# Patient Record
Sex: FEMALE | Race: WHITE | Employment: FULL TIME | URBAN - METROPOLITAN AREA
[De-identification: names, ages, dates, MRNs, and addresses within clinical notes are randomized per-mention and may not be internally consistent; named-entity substitution may affect disease eponyms.]

---

## 2019-01-01 ENCOUNTER — TRANSFERRED RECORDS (OUTPATIENT)
Dept: HEALTH INFORMATION MANAGEMENT | Facility: CLINIC | Age: 49
End: 2019-01-01

## 2019-01-01 LAB — PAP SMEAR - HIM PATIENT REPORTED: NEGATIVE

## 2021-02-23 ENCOUNTER — OFFICE VISIT (OUTPATIENT)
Dept: OBGYN | Facility: CLINIC | Age: 51
End: 2021-02-23
Payer: COMMERCIAL

## 2021-02-23 VITALS — WEIGHT: 142 LBS | SYSTOLIC BLOOD PRESSURE: 114 MMHG | DIASTOLIC BLOOD PRESSURE: 62 MMHG

## 2021-02-23 DIAGNOSIS — N92.6 IRREGULAR PERIODS: ICD-10-CM

## 2021-02-23 DIAGNOSIS — N92.1 MENORRHAGIA WITH IRREGULAR CYCLE: Primary | ICD-10-CM

## 2021-02-23 LAB
ERYTHROCYTE [DISTWIDTH] IN BLOOD BY AUTOMATED COUNT: 13.9 % (ref 10–15)
HCG, QUAL URINE: NORMAL
HCT VFR BLD AUTO: 32.1 % (ref 35–47)
HGB BLD-MCNC: 10.2 G/DL (ref 11.7–15.7)
MCH RBC QN AUTO: 30.5 PG (ref 26.5–33)
MCHC RBC AUTO-ENTMCNC: 31.8 G/DL (ref 31.5–36.5)
MCV RBC AUTO: 96 FL (ref 78–100)
PLATELET # BLD AUTO: 312 10E9/L (ref 150–450)
RBC # BLD AUTO: 3.34 10E12/L (ref 3.8–5.2)
WBC # BLD AUTO: 7.5 10E9/L (ref 4–11)

## 2021-02-23 PROCEDURE — 84703 CHORIONIC GONADOTROPIN ASSAY: CPT | Performed by: ADVANCED PRACTICE MIDWIFE

## 2021-02-23 PROCEDURE — 99204 OFFICE O/P NEW MOD 45 MIN: CPT | Performed by: ADVANCED PRACTICE MIDWIFE

## 2021-02-23 PROCEDURE — 36415 COLL VENOUS BLD VENIPUNCTURE: CPT | Performed by: ADVANCED PRACTICE MIDWIFE

## 2021-02-23 PROCEDURE — 85027 COMPLETE CBC AUTOMATED: CPT | Performed by: ADVANCED PRACTICE MIDWIFE

## 2021-02-23 PROCEDURE — 84443 ASSAY THYROID STIM HORMONE: CPT | Performed by: ADVANCED PRACTICE MIDWIFE

## 2021-02-23 RX ORDER — MEDROXYPROGESTERONE ACETATE 10 MG
10 TABLET ORAL DAILY
Qty: 10 TABLET | Refills: 0 | Status: SHIPPED | OUTPATIENT
Start: 2021-02-23 | End: 2021-04-26

## 2021-02-23 NOTE — PATIENT INSTRUCTIONS
Patient Education     Heavy Menstrual Bleeding    Heavy menstrual bleeding means that your periods are heavier or longer than normal. You may soak through a pad or tampon every 1 to 3 hours on the heaviest days of your period. You may also pass large, dark clots. And your periods may last longer than 7 days.   If you have heavy periods often, this can cause a problem called anemia. With anemia, your red blood cell count is too low. Red blood cells are needed as they help carry oxygen all over your body. Severe anemia may make you look pale and feel weak or tired. You might also get short of breath easily.   There are many possible causes of heavy menstrual bleeding. Hormonal imbalance is the most common cause. Having noncancer (benign) growths in your uterus is another cause. These include fibroids or polyps. Taking certain medicines or having certain health problems or bleeding disorders are also causes.   To treat heavy menstrual bleeding, your healthcare provider may prescribe medicines first. If these don t help, you may need more testing and treatments.   Home care  Medicines  If you re prescribed medicines, take them as directed.     To help control heavy bleeding, any of these may be used:  ? Hormone therapy (this includes all types of hormonal birth control such as pills, shots, cream, ring, patch, or hormone-releasing IUD)  ? Nonsteroidal anti-inflammatory drugs (NSAIDs), such as ibuprofen  ? Antifibrinolytic medicines, such as transexamic acid    To help treat anemia, iron supplements may be prescribed.            General care    Get plenty of rest if you tire easily. Avoid heavy exertion.    To help ease pain or cramping, try using a heating pad on the lower belly or back. A warm bath may also help.    Follow-up care  Follow up with your healthcare provider, or as advised.   When to get medical advice  Call your healthcare provider right away if any of these occur:     Heavier bleeding (soaking 1 pad or  tampon every hour for 3 hours)    Heavy bleeding that lasts longer than 1 week    Fever of 100.4 F (38 C) or higher, or as directed by your provider    Pain or cramping that gets worse instead of better    Signs of anemia such as pale skin, extreme tiredness or weakness, or shortness of breath    Dizziness or fainting  Galen last reviewed this educational content on 6/1/2020 2000-2020 [employer], [email or web address]. [address] [place] [address], [place] [address]. All rights reserved. This information is not intended as a substitute for professional medical care. Always follow your healthcare professional's instructions.           Patient Education     Dysfunctional Uterine Bleeding    Dysfunctional uterine bleeding is also called abnormal uterine bleeding. It's a condition in which bleeding is abnormal and occurs at unexpected times of the month. This happens because of changes in the hormones that help control a woman s menstrual cycle each month.   The bleeding may be heavier or lighter than normal. If you have heavy bleeding often, this can lead to a problem called anemia. With anemia, your red blood cell count is too low. Red blood cells help carry oxygen throughout your body. Severe anemia may cause you to look pale and feel very weak or tired. You might also become short of breath easily.   To treat dysfunctional uterine bleeding, you may take medicines. If these don t help, or if you have other symptoms or have reached menopause, you may need more testing and other treatments. Discuss all of your options with your provider.   Home care  Medicines  If you re prescribed medicines, take them as directed. Some of the more common medicines you may be prescribed include:     Hormone therapy. These include most methods of hormonal birth control such as pills, shots, or a hormone-releasing IUD.    Nonsteroidal anti-inflammatory drugs (NSAIDs), such as ibuprofen    Tranexamic acid. This helps your blood clot.    Iron  supplements, if you have anemia     General care    Get plenty of rest if you tire easily. Don't do strenuous exercise.    To help ease pain or cramping that may occur with bleeding, try using a heating pad on the lower belly or back. A warm bath may also help.    Follow-up care  Follow up with your healthcare provider, or as directed.  When to seek medical advice  Call your healthcare provider right away if:    Bleeding becomes heavy (soaking 1 pad or tampon every hour for 3 hours)    Increased abdominal pain    Irregular bleeding gets worse or does not get better even with treatment    Fever of 100.4 F (38 C) or higher, or as directed by your provider    Signs of anemia, such as pale skin, extreme fatigue or weakness, or shortness of breath    Dizziness or fainting   Galen last reviewed this educational content on 7/1/2020 2000-2020 [employer]. [address] [place] [address], [place] [address]. All rights reserved. This information is not intended as a substitute for professional medical care. Always follow your healthcare professional's instructions.

## 2021-02-23 NOTE — NURSING NOTE
"Chief Complaint   Patient presents with     Abnormal Uterine Bleeding     heavy period for over 3 wks       Initial /62 (BP Location: Left arm, Cuff Size: Adult Regular)   Wt 64.4 kg (142 lb)   LMP 2021 (Approximate)  Estimated body mass index is 21.31 kg/m  as calculated from the following:    Height as of 05: 1.676 m (5' 6\").    Weight as of 05: 59.9 kg (132 lb).  BP completed using cuff size: regular    Questioned patient about current smoking habits.  Pt. has never smoked.          The following HM Due: NONE      The following patient reported/Care Every where data was sent to:  P ABSTRACT QUALITY INITIATIVES [[id number]]  Pap smear done on this date: 2019 (approximately), results were normal.       Nila Fields CMA               "

## 2021-02-23 NOTE — PROGRESS NOTES
Midwife Dysfunctional Uterine Bleeding Note    Date: 2021    CC:  Abnormal Uterine Bleeding    HPI:  Leslee Bazzi is a 50 year old female  presents for evaluation of abnormal uterine bleeding.      Duration of bleeding problem: Menses started getting irregular about a year ago, started bleeding on 2021, and is still bleeding now.  Frequency of bleeding: Daily for past 21 days.  Flow: heavy, changes a pad every 1 hours at the heaviest  Breakthrough bleeding: no  Post-coital bleeding: no  Pelvic pain: no    Her work-up thus far for her abnormal bleeding has included:  - pregnancy test: neg   - TSH: pending  - transvaginal ultrasound: pending  - Hgb: pending   - STI screen: declined   - Last pap: 2019, WNL    Patient has tried to following treatments: none.     GYN HISTORY:  Patient's last menstrual period was 2021 (approximate).     Menopause: no  Menses: occured every 28-52 days lasting 7-10  days in duration.   STI history: No STD history    History of cervical procedures: none   Contraceptive History:  had vasectomy 12 years ago  The patient is sexually active with male partners.       Patient has following risk factors for endometrial cancer:  - Unopposed estrogen exposure: No  - Obesity: No  - Smoking: No  - Nulliparity: No  - Infertility: No  - Early age of menarche / late age of menopause: No  - Family history of colon cancer, ovarian cancer, and type I endometrial cancer: No    OBSTETRIC HISTORY:   OB History    Para Term  AB Living   2 2 0 0 0 0   SAB TAB Ectopic Multiple Live Births   0 0 0 0 0      # Outcome Date GA Lbr Chip/2nd Weight Sex Delivery Anes PTL Lv   2 Para      Vag-Spont      1 Para      Vag-Spont            Past Medical History:   Diagnosis Date     ACNE       DEPRESSION POSTPARTUM 2005     GENERALIZED ABDOMINAL PAIN, ? IBS 2005     HX SUBLUXATION OF R PATELLA      Radial styloid tenosynovitis 2005         Past Surgical History:    Procedure Laterality Date     HC DESTRUCT PREMALIGNANT LESION, FIRST  4/02    shave bx, compound nevus L ant chest wall           Family History   Problem Relation Age of Onset     Hypertension Father      Prostate Cancer Father      Hypertension Mother      Cancer Maternal Grandfather         LUNG     Cancer Paternal Grandfather         LUNG     Diabetes Other         GM     Cerebrovascular Disease Other         GM     There is no family history of uterine, ovarian, breast, colon cancer.     Current Outpatient Medications   Medication Sig Dispense Refill     medroxyPROGESTERone (PROVERA) 10 MG tablet Take 1 tablet (10 mg) by mouth daily 10 tablet 0       Allergies: No known drug allergies    ROS:  C: NEGATIVE for fever, chills, change in weight  I: NEGATIVE for worrisome rashes, moles or lesions  E: NEGATIVE for vision changes or irritation  E/M: NEGATIVE for ear, mouth and throat problems  R: NEGATIVE for significant cough or SOB  CV: NEGATIVE for chest pain, palpitations or peripheral edema  GI: NEGATIVE for nausea, abdominal pain, heartburn, or change in bowel habits  : POSITIVE for abnormal bleeding as above, NEGATIVE for frequency, dysuria, hematuria, vaginal discharge  M: NEGATIVE for significant arthralgias or myalgia  N: NEGATIVE for weakness, dizziness or paresthesias  E: NEGATIVE for temperature intolerance, skin/hair changes  P: NEGATIVE for changes in mood or affect    EXAM:  Blood pressure 114/62, weight 64.4 kg (142 lb), last menstrual period 02/01/2021.   BMI= There is no height or weight on file to calculate BMI.  General - pleasant female in no acute distress.  Abdomen - soft, nontender  Pelvic - external genitalia: normal adult female without lesions or abnormalities   BUS: within normal limits  Vagina: well rugated, no discharge, no lesions  Cervix: no lesions or CMT, bleeding noted to be coming from cervical os  Uterus: Normal size, mobile and nontender  Adnexae: no masses or  tenderness.  Rectovaginal - deferred.  Musculoskeletal - no gross deformities.  Neurological - normal strength, sensation, and mental status.      ASSESSMENT:  Leslee Bazzi is a 50 year old  who presents with abnormal uterine bleeding. Discussed differential diagnosis.     PLAN:    ICD-10-CM    1. Menorrhagia with irregular cycle  N92.1 TSH with free T4 reflex     CBC with platelets     US Pelvic Complete with Transvaginal     medroxyPROGESTERone (PROVERA) 10 MG tablet   2. Irregular periods  N92.6 HCL HCG, URINE, NURSE BACKOFFICE      Endometrial biopsy was not today to rule out hyperplasia and malignancy. Patient prefers to wait until after her ultrasound  Transvaginal ultrasound: yes, after withdrawal bleed from provera.  Cervical cancer screening: yes, in 2019    F/U:  Will advise patient of results when available. If all normal. Patient is interested in ablation. Will refer to surgeon for follow up when labs and ultrasound available.      WALDEMAR Maldonado, ZEYADM

## 2021-02-24 ENCOUNTER — TELEPHONE (OUTPATIENT)
Dept: OBGYN | Facility: CLINIC | Age: 51
End: 2021-02-24

## 2021-02-24 DIAGNOSIS — D62 ANEMIA DUE TO BLOOD LOSS, ACUTE: Primary | ICD-10-CM

## 2021-02-24 LAB — TSH SERPL DL<=0.005 MIU/L-ACNC: 0.92 MU/L (ref 0.4–4)

## 2021-02-24 RX ORDER — FERROUS SULFATE 325(65) MG
325 TABLET, DELAYED RELEASE (ENTERIC COATED) ORAL EVERY OTHER DAY
Qty: 30 TABLET | Refills: 1 | Status: SHIPPED | OUTPATIENT
Start: 2021-02-24

## 2021-02-24 NOTE — TELEPHONE ENCOUNTER
Attempted to call patient regarding recent lab results. Patient has become anemic due to heavy prolonged menses. I have ordered iron supplements for her and sent them to her pharmacy, the [facility] in [place]. I have also ordered her a follow up CBC that I want her to schedule about 4 weeks after she has been taking the iron supplements.      Thank you,    WALDEMAR Maldonado, ZEYADM

## 2021-03-01 ENCOUNTER — MYC MEDICAL ADVICE (OUTPATIENT)
Dept: OBGYN | Facility: CLINIC | Age: 51
End: 2021-03-01

## 2021-03-01 DIAGNOSIS — N92.1 MENORRHAGIA WITH IRREGULAR CYCLE: Primary | ICD-10-CM

## 2021-03-01 RX ORDER — MEDROXYPROGESTERONE ACETATE 10 MG
10 TABLET ORAL DAILY
Qty: 10 TABLET | Refills: 0 | Status: SHIPPED | OUTPATIENT
Start: 2021-03-01 | End: 2021-04-26

## 2021-03-11 ENCOUNTER — MYC MEDICAL ADVICE (OUTPATIENT)
Dept: OBGYN | Facility: CLINIC | Age: 51
End: 2021-03-11

## 2021-03-18 ENCOUNTER — ANCILLARY PROCEDURE (OUTPATIENT)
Dept: ULTRASOUND IMAGING | Facility: CLINIC | Age: 51
End: 2021-03-18
Attending: ADVANCED PRACTICE MIDWIFE
Payer: COMMERCIAL

## 2021-03-18 DIAGNOSIS — N92.1 MENORRHAGIA WITH IRREGULAR CYCLE: ICD-10-CM

## 2021-03-18 PROCEDURE — 76856 US EXAM PELVIC COMPLETE: CPT | Performed by: OBSTETRICS & GYNECOLOGY

## 2021-03-18 PROCEDURE — 76830 TRANSVAGINAL US NON-OB: CPT | Performed by: OBSTETRICS & GYNECOLOGY

## 2021-03-22 ENCOUNTER — MYC MEDICAL ADVICE (OUTPATIENT)
Dept: OBGYN | Facility: CLINIC | Age: 51
End: 2021-03-22

## 2021-03-22 NOTE — TELEPHONE ENCOUNTER
Called patient and discussed result of ultrasound. Patient already has appointment to see Dr. Wilcox to discuss possible ablation.      WALDEMAR Maldonado, CNM

## 2021-04-04 ENCOUNTER — HEALTH MAINTENANCE LETTER (OUTPATIENT)
Age: 51
End: 2021-04-04

## 2021-04-26 ENCOUNTER — OFFICE VISIT (OUTPATIENT)
Dept: OBGYN | Facility: CLINIC | Age: 51
End: 2021-04-26
Payer: COMMERCIAL

## 2021-04-26 ENCOUNTER — TELEPHONE (OUTPATIENT)
Dept: OBGYN | Facility: CLINIC | Age: 51
End: 2021-04-26

## 2021-04-26 VITALS
WEIGHT: 135 LBS | DIASTOLIC BLOOD PRESSURE: 70 MMHG | HEIGHT: 66 IN | SYSTOLIC BLOOD PRESSURE: 120 MMHG | BODY MASS INDEX: 21.69 KG/M2

## 2021-04-26 DIAGNOSIS — N93.9 ABNORMAL UTERINE BLEEDING (AUB): Primary | ICD-10-CM

## 2021-04-26 DIAGNOSIS — Z20.822 COVID-19 RULED OUT: ICD-10-CM

## 2021-04-26 DIAGNOSIS — Z01.818 PREPROCEDURAL EXAMINATION: Primary | ICD-10-CM

## 2021-04-26 DIAGNOSIS — D62 ANEMIA DUE TO BLOOD LOSS, ACUTE: ICD-10-CM

## 2021-04-26 PROCEDURE — 99214 OFFICE O/P EST MOD 30 MIN: CPT | Performed by: OBSTETRICS & GYNECOLOGY

## 2021-04-26 RX ORDER — FAMOTIDINE 20 MG
TABLET ORAL DAILY
COMMUNITY
End: 2021-05-20

## 2021-04-26 ASSESSMENT — MIFFLIN-ST. JEOR: SCORE: 1249.11

## 2021-04-26 NOTE — TELEPHONE ENCOUNTER
Type of surgery: hysteroscopy, d&c, endometrial ablation  Location of surgery: [facility]  Date and time of surgery: 5/12/21 @ 8:00 am  Surgeon: Dr. Victorino Wilcox  Pre-Op Appt Date: Patient advised to schedule.  Post-Op Appt Date:    Packet sent out: Yes  Pre-cert/Authorization completed:  No  Date: 4/26/21

## 2021-04-26 NOTE — PROGRESS NOTES
"  SUBJECTIVE:                                                   CC:  Patient presents with:  Abnormal Bleeding Problem      HPI:  Leslee Bazzi is a 50 year old  who presents to discuss endometrial ablation.  She was worked up for AUB with Tami Finn CNM and desired ablation.  She has several friends who have had it and really liked it.  Cycles are irregular, very heavy lasting up to 10 days.  Has been on iron due to iron deficiency from the heavy bleeding.   She just had a period start yesterday and it's pretty heavy.   Has two kids, ages 12 and 16.   Vasectomy for birth control     Gyn History:  Patient's last menstrual period was 2021 (exact date).       Using vasectomy for contraception.    Last 3 Pap and HPV Results:   PAP / HPV 2005   PAP NIL   normal in 2019 per pt report    PMH, PSH, Soc Hx, Fam Hx, Meds, and allergies reviewed in Epic.    OBJECTIVE:     /70 (BP Location: Right arm, Patient Position: Chair, Cuff Size: Adult Regular)   Ht 1.676 m (5' 6\")   Wt 61.2 kg (135 lb)   LMP 2021 (Exact Date)   Breastfeeding No   BMI 21.79 kg/m      Gen: Healthy appearing thin female, no acute distress, comfortable  Psych: mentation appears normal and affect bright    Test Results:  [facility]     ULTRASOUND - PELVIC GYN- Transabdominal and Transvaginal     Referring MD: Tami Finn CNM     ===================================     CLINICAL INFORMATION     Indications for ultrasound:   Bleeding/Menses - Menometrorrhagia (heavy, irregular menses)     LMP: 2021    Hormones: none     Measurements:  Uterus:  11.1 x 8.2 x 6.8  cm     Position is anteverted.  Contour is irregular w/ myomata:   1 Right Fundal 3.3 x 3.9 x 4.2 cm and 2 Left Fundal 2.5 x 1.8 x 1.8 cm.     Endo cav: 9.1 mm       Smooth/regular/wnl     Right ovary: 2.8  X 3.1 x 3.4 cm  Simple cyst 2.1 x 1.9 x 2.3cm  Left ovary:  2.1 x 1.9 x 1.6 cm  wnl     Cul de sac: no " free fluid     ===================================  Impression:     Complete pelvic ultrasound using realtime   transabdominal and transvaginal scanning.  Bladder appears normal.     Normal left ovary  Uterine fibroids, not affecting the uterine cavity  Simple right ovarian cyst(s)  Normal endometrial lining  No free fluid in the cul de sac     Asymptomatic simple cysts less than 5 cm in premenopausal women do not require follow up as a general rule, but clinical correlation is recommended.       Component      Latest Ref Rng & Units 2/23/2021   WBC      4.0 - 11.0 10e9/L 7.5   RBC Count      3.8 - 5.2 10e12/L 3.34 (L)   Hemoglobin      11.7 - 15.7 g/dL 10.2 (L)   Hematocrit      35.0 - 47.0 % 32.1 (L)   MCV      78 - 100 fl 96   MCH      26.5 - 33.0 pg 30.5   MCHC      31.5 - 36.5 g/dL 31.8   RDW      10.0 - 15.0 % 13.9   Platelet Count      150 - 450 10e9/L 312   TSH      0.40 - 4.00 mU/L 0.92     ASSESSMENT/PLAN:                                                      1. Abnormal uterine bleeding (AUB)  Discussed options for treatment including OCPs, LARCs, and surgical options including ablation and hysterectomy.  She has had a work up as above, desirous of endometrial ablation.  Vasectomy for birth control.  Unsure what age her mother went through menopause.  Declines EMB today as she is bleeding already.  Recommend hysteroscopy with D&C, endometrial ablation.  Discussed risks of surgery including bleeding to the point of requiring blood transfusion, infection, injury to surrounding organs, possibility of needing a larger incision, blood clot, and pneumonia.  Discussed risks of endometrial ablation of undiagnosed endometrial hyperplasia, cramping post op, or recovery of the lining and recurrent AUB.  Discussed recovery expectations and that this is typically a same-day surgery.  Pt has had all questions answered and has agreed to proceed.  Will meet with PCP for pre-op physical with PCP.  Will sign consent form  day of the procedure.  Pre-op orders entered.   - [facility]-Operative Worksheet    2. Anemia due to blood loss, acute  Continue iron until procedure, she may not require it after that time.    Fanny Wilcox MD, MPH  Obstetrics and Gynecology      Total time spent was 32 minutes; this includes pre-work time, intra-service time, and post-work time including time spent on documentation which occurred on the date of service.

## 2021-04-26 NOTE — NURSING NOTE
"Chief Complaint   Patient presents with     Abnormal Bleeding Problem       Initial /70 (BP Location: Right arm, Patient Position: Chair, Cuff Size: Adult Regular)   Ht 1.676 m (5' 6\")   Wt 61.2 kg (135 lb)   LMP 2021 (Exact Date)   Breastfeeding No   BMI 21.79 kg/m   Estimated body mass index is 21.79 kg/m  as calculated from the following:    Height as of this encounter: 1.676 m (5' 6\").    Weight as of this encounter: 61.2 kg (135 lb).  BP completed using cuff size: regular    Questioned patient about current smoking habits.  Pt. has never smoked.          The following HM Due: NONE    Quyen Armas CMA    "

## 2021-04-26 NOTE — TELEPHONE ENCOUNTER
Procedure name(s) - multi select hysteroscopy, D&C, endometrial ablation   Reason for procedure abnormal uterine bleeding   Is this a multi surgeon case? No   Laterality N/A   Request for additional equipment Other (see comments)   Comment: None   Anesthesia Choice   Initiate Pre-op orders for above procedure: Yes, as ordered in Epic   Comment: Additional orders noted there also   Location of Case: [facility]   Surgeon Procedure Time (incision to closure) in minutes (per procedure as applicable) 30   Note:  Surgical Case Time Needed (in minutes)   Patient Class (for admit prior to surgery, specify number of days in comments): Same day (surgery center outpatient)   Vendor Needed? Yes   Comment: [employer]   Other/Additional Comments, as needed: could be at hospital or ASC

## 2021-05-09 ENCOUNTER — HOSPITAL ENCOUNTER (OUTPATIENT)
Dept: LAB | Facility: CLINIC | Age: 51
Discharge: HOME | End: 2021-05-09
Attending: OBSTETRICS & GYNECOLOGY | Admitting: OBSTETRICS & GYNECOLOGY
Payer: COMMERCIAL

## 2021-05-09 DIAGNOSIS — Z01.818 PREPROCEDURAL EXAMINATION: ICD-10-CM

## 2021-05-09 DIAGNOSIS — Z20.822 COVID-19 RULED OUT: ICD-10-CM

## 2021-05-09 LAB
LABORATORY COMMENT REPORT: NORMAL
SARS-COV-2 RNA RESP QL NAA+PROBE: NEGATIVE
SARS-COV-2 RNA RESP QL NAA+PROBE: NORMAL
SPECIMEN SOURCE: NORMAL
SPECIMEN SOURCE: NORMAL

## 2021-05-09 PROCEDURE — U0003 INFECTIOUS AGENT DETECTION BY NUCLEIC ACID (DNA OR RNA); SEVERE ACUTE RESPIRATORY SYNDROME CORONAVIRUS 2 (SARS-COV-2) (CORONAVIRUS DISEASE [COVID-19]), AMPLIFIED PROBE TECHNIQUE, MAKING USE OF HIGH THROUGHPUT TECHNOLOGIES AS DESCRIBED BY CMS-2020-01-R: HCPCS | Performed by: OBSTETRICS & GYNECOLOGY

## 2021-05-09 PROCEDURE — U0005 INFEC AGEN DETEC AMPLI PROBE: HCPCS | Performed by: OBSTETRICS & GYNECOLOGY

## 2021-05-11 ENCOUNTER — PREP FOR PROCEDURE (OUTPATIENT)
Dept: OBGYN | Facility: CLINIC | Age: 51
End: 2021-05-11

## 2021-05-11 DIAGNOSIS — Z11.59 ENCOUNTER FOR SCREENING FOR OTHER VIRAL DISEASES: ICD-10-CM

## 2021-05-11 DIAGNOSIS — N93.9 ABNORMAL UTERINE BLEEDING: Primary | ICD-10-CM

## 2021-05-11 NOTE — TELEPHONE ENCOUNTER
Received a call from the surgery center.  Patient is not a candidate to be scheduled at the surgery center and will need to be rescheduled at the hospital.    Type of surgery: hysteroscopy, d&c, endometrial ablation  Location of surgery: [facility]  Date and time of surgery: 5/26/21 @ 8:15 am  Surgeon: Dr. Victorino Wilcox  Pre-Op Appt Date: 5/3/21  Post-Op Appt Date:    Packet sent out: Yes  Pre-cert/Authorization completed:  No  Date: 5/11/21

## 2021-05-20 ASSESSMENT — MIFFLIN-ST. JEOR: SCORE: 1219.63

## 2021-05-23 ENCOUNTER — HOSPITAL ENCOUNTER (OUTPATIENT)
Dept: LAB | Facility: CLINIC | Age: 51
Discharge: HOME | End: 2021-05-23
Admitting: OBSTETRICS & GYNECOLOGY
Payer: COMMERCIAL

## 2021-05-23 DIAGNOSIS — Z11.59 ENCOUNTER FOR SCREENING FOR OTHER VIRAL DISEASES: ICD-10-CM

## 2021-05-23 PROCEDURE — U0005 INFEC AGEN DETEC AMPLI PROBE: HCPCS | Performed by: OBSTETRICS & GYNECOLOGY

## 2021-05-23 PROCEDURE — U0003 INFECTIOUS AGENT DETECTION BY NUCLEIC ACID (DNA OR RNA); SEVERE ACUTE RESPIRATORY SYNDROME CORONAVIRUS 2 (SARS-COV-2) (CORONAVIRUS DISEASE [COVID-19]), AMPLIFIED PROBE TECHNIQUE, MAKING USE OF HIGH THROUGHPUT TECHNOLOGIES AS DESCRIBED BY CMS-2020-01-R: HCPCS | Performed by: OBSTETRICS & GYNECOLOGY

## 2021-05-26 ENCOUNTER — ANESTHESIA (OUTPATIENT)
Dept: SURGERY | Facility: CLINIC | Age: 51
End: 2021-05-26
Payer: COMMERCIAL

## 2021-05-26 ENCOUNTER — ANESTHESIA EVENT (OUTPATIENT)
Dept: SURGERY | Facility: CLINIC | Age: 51
End: 2021-05-26
Payer: COMMERCIAL

## 2021-05-26 ENCOUNTER — HOSPITAL ENCOUNTER (OUTPATIENT)
Facility: CLINIC | Age: 51
Discharge: HOME | End: 2021-05-26
Attending: OBSTETRICS & GYNECOLOGY | Admitting: OBSTETRICS & GYNECOLOGY
Payer: COMMERCIAL

## 2021-05-26 VITALS
BODY MASS INDEX: 22.36 KG/M2 | TEMPERATURE: 97.2 F | RESPIRATION RATE: 16 BRPM | WEIGHT: 134.2 LBS | OXYGEN SATURATION: 99 % | DIASTOLIC BLOOD PRESSURE: 72 MMHG | SYSTOLIC BLOOD PRESSURE: 115 MMHG | HEIGHT: 65 IN | HEART RATE: 70 BPM

## 2021-05-26 DIAGNOSIS — N93.9 ABNORMAL UTERINE BLEEDING: ICD-10-CM

## 2021-05-26 DIAGNOSIS — Z98.890 S/P ENDOMETRIAL ABLATION: Primary | ICD-10-CM

## 2021-05-26 LAB — HCG UR QL: NEGATIVE

## 2021-05-26 PROCEDURE — 710N000009 HC RECOVERY PHASE 1, LEVEL 1, PER MIN: Performed by: OBSTETRICS & GYNECOLOGY

## 2021-05-26 PROCEDURE — 250N000011 HC RX IP 250 OP 636: Performed by: NURSE ANESTHETIST, CERTIFIED REGISTERED

## 2021-05-26 PROCEDURE — 272N000001 HC OR GENERAL SUPPLY STERILE: Performed by: OBSTETRICS & GYNECOLOGY

## 2021-05-26 PROCEDURE — 250N000011 HC RX IP 250 OP 636: Performed by: OBSTETRICS & GYNECOLOGY

## 2021-05-26 PROCEDURE — 258N000003 HC RX IP 258 OP 636: Performed by: NURSE ANESTHETIST, CERTIFIED REGISTERED

## 2021-05-26 PROCEDURE — 58563 HYSTEROSCOPY ABLATION: CPT | Performed by: OBSTETRICS & GYNECOLOGY

## 2021-05-26 PROCEDURE — 360N000076 HC SURGERY LEVEL 3, PER MIN: Performed by: OBSTETRICS & GYNECOLOGY

## 2021-05-26 PROCEDURE — C1888 ENDOVAS NON-CARDIAC ABL CATH: HCPCS | Performed by: OBSTETRICS & GYNECOLOGY

## 2021-05-26 PROCEDURE — 999N000141 HC STATISTIC PRE-PROCEDURE NURSING ASSESSMENT: Performed by: OBSTETRICS & GYNECOLOGY

## 2021-05-26 PROCEDURE — 88305 TISSUE EXAM BY PATHOLOGIST: CPT | Mod: 26 | Performed by: PATHOLOGY

## 2021-05-26 PROCEDURE — 370N000017 HC ANESTHESIA TECHNICAL FEE, PER MIN: Performed by: OBSTETRICS & GYNECOLOGY

## 2021-05-26 PROCEDURE — 250N000009 HC RX 250: Performed by: OBSTETRICS & GYNECOLOGY

## 2021-05-26 PROCEDURE — 710N000012 HC RECOVERY PHASE 2, PER MINUTE: Performed by: OBSTETRICS & GYNECOLOGY

## 2021-05-26 PROCEDURE — 81025 URINE PREGNANCY TEST: CPT | Performed by: ANESTHESIOLOGY

## 2021-05-26 PROCEDURE — 88305 TISSUE EXAM BY PATHOLOGIST: CPT | Mod: TC | Performed by: OBSTETRICS & GYNECOLOGY

## 2021-05-26 PROCEDURE — 250N000013 HC RX MED GY IP 250 OP 250 PS 637: Performed by: OBSTETRICS & GYNECOLOGY

## 2021-05-26 PROCEDURE — 250N000009 HC RX 250: Performed by: NURSE ANESTHETIST, CERTIFIED REGISTERED

## 2021-05-26 PROCEDURE — 258N000001 HC RX 258: Performed by: OBSTETRICS & GYNECOLOGY

## 2021-05-26 RX ORDER — NALOXONE HYDROCHLORIDE 0.4 MG/ML
0.4 INJECTION, SOLUTION INTRAMUSCULAR; INTRAVENOUS; SUBCUTANEOUS
Status: DISCONTINUED | OUTPATIENT
Start: 2021-05-26 | End: 2021-05-26 | Stop reason: HOSPADM

## 2021-05-26 RX ORDER — ONDANSETRON 2 MG/ML
4 INJECTION INTRAMUSCULAR; INTRAVENOUS EVERY 30 MIN PRN
Status: DISCONTINUED | OUTPATIENT
Start: 2021-05-26 | End: 2021-05-26 | Stop reason: HOSPADM

## 2021-05-26 RX ORDER — LIDOCAINE HYDROCHLORIDE 10 MG/ML
INJECTION, SOLUTION INFILTRATION; PERINEURAL PRN
Status: DISCONTINUED | OUTPATIENT
Start: 2021-05-26 | End: 2021-05-26

## 2021-05-26 RX ORDER — DEXAMETHASONE SODIUM PHOSPHATE 4 MG/ML
INJECTION, SOLUTION INTRA-ARTICULAR; INTRALESIONAL; INTRAMUSCULAR; INTRAVENOUS; SOFT TISSUE PRN
Status: DISCONTINUED | OUTPATIENT
Start: 2021-05-26 | End: 2021-05-26

## 2021-05-26 RX ORDER — FENTANYL CITRATE 50 UG/ML
25-50 INJECTION, SOLUTION INTRAMUSCULAR; INTRAVENOUS
Status: DISCONTINUED | OUTPATIENT
Start: 2021-05-26 | End: 2021-05-26 | Stop reason: HOSPADM

## 2021-05-26 RX ORDER — ONDANSETRON 4 MG/1
4 TABLET, ORALLY DISINTEGRATING ORAL EVERY 30 MIN PRN
Status: DISCONTINUED | OUTPATIENT
Start: 2021-05-26 | End: 2021-05-26 | Stop reason: HOSPADM

## 2021-05-26 RX ORDER — IBUPROFEN 800 MG/1
800 TABLET, FILM COATED ORAL EVERY 6 HOURS PRN
Qty: 30 TABLET | Refills: 0 | Status: SHIPPED | OUTPATIENT
Start: 2021-05-26

## 2021-05-26 RX ORDER — SODIUM CHLORIDE, SODIUM LACTATE, POTASSIUM CHLORIDE, CALCIUM CHLORIDE 600; 310; 30; 20 MG/100ML; MG/100ML; MG/100ML; MG/100ML
INJECTION, SOLUTION INTRAVENOUS CONTINUOUS PRN
Status: DISCONTINUED | OUTPATIENT
Start: 2021-05-26 | End: 2021-05-26

## 2021-05-26 RX ORDER — PROPOFOL 10 MG/ML
INJECTION, EMULSION INTRAVENOUS PRN
Status: DISCONTINUED | OUTPATIENT
Start: 2021-05-26 | End: 2021-05-26

## 2021-05-26 RX ORDER — ACETAMINOPHEN 325 MG/1
975 TABLET ORAL ONCE
Status: COMPLETED | OUTPATIENT
Start: 2021-05-26 | End: 2021-05-26

## 2021-05-26 RX ORDER — KETOROLAC TROMETHAMINE 30 MG/ML
30 INJECTION, SOLUTION INTRAMUSCULAR; INTRAVENOUS ONCE
Status: COMPLETED | OUTPATIENT
Start: 2021-05-26 | End: 2021-05-26

## 2021-05-26 RX ORDER — GLYCOPYRROLATE 0.2 MG/ML
INJECTION, SOLUTION INTRAMUSCULAR; INTRAVENOUS PRN
Status: DISCONTINUED | OUTPATIENT
Start: 2021-05-26 | End: 2021-05-26

## 2021-05-26 RX ORDER — ACETAMINOPHEN 325 MG/1
975 TABLET ORAL ONCE
Status: CANCELLED | OUTPATIENT
Start: 2021-05-26 | End: 2021-05-26

## 2021-05-26 RX ORDER — ONDANSETRON 2 MG/ML
INJECTION INTRAMUSCULAR; INTRAVENOUS PRN
Status: DISCONTINUED | OUTPATIENT
Start: 2021-05-26 | End: 2021-05-26

## 2021-05-26 RX ORDER — HYDROMORPHONE HYDROCHLORIDE 1 MG/ML
.3-.5 INJECTION, SOLUTION INTRAMUSCULAR; INTRAVENOUS; SUBCUTANEOUS EVERY 10 MIN PRN
Status: DISCONTINUED | OUTPATIENT
Start: 2021-05-26 | End: 2021-05-26 | Stop reason: HOSPADM

## 2021-05-26 RX ORDER — IBUPROFEN 800 MG/1
800 TABLET, FILM COATED ORAL ONCE
Status: CANCELLED | OUTPATIENT
Start: 2021-05-26 | End: 2021-05-26

## 2021-05-26 RX ORDER — MEPERIDINE HYDROCHLORIDE 25 MG/ML
12.5 INJECTION INTRAMUSCULAR; INTRAVENOUS; SUBCUTANEOUS
Status: DISCONTINUED | OUTPATIENT
Start: 2021-05-26 | End: 2021-05-26 | Stop reason: HOSPADM

## 2021-05-26 RX ORDER — NALOXONE HYDROCHLORIDE 0.4 MG/ML
0.2 INJECTION, SOLUTION INTRAMUSCULAR; INTRAVENOUS; SUBCUTANEOUS
Status: DISCONTINUED | OUTPATIENT
Start: 2021-05-26 | End: 2021-05-26 | Stop reason: HOSPADM

## 2021-05-26 RX ORDER — SODIUM CHLORIDE, SODIUM LACTATE, POTASSIUM CHLORIDE, CALCIUM CHLORIDE 600; 310; 30; 20 MG/100ML; MG/100ML; MG/100ML; MG/100ML
INJECTION, SOLUTION INTRAVENOUS CONTINUOUS
Status: DISCONTINUED | OUTPATIENT
Start: 2021-05-26 | End: 2021-05-26 | Stop reason: HOSPADM

## 2021-05-26 RX ORDER — FENTANYL CITRATE 50 UG/ML
INJECTION, SOLUTION INTRAMUSCULAR; INTRAVENOUS PRN
Status: DISCONTINUED | OUTPATIENT
Start: 2021-05-26 | End: 2021-05-26

## 2021-05-26 RX ORDER — LABETALOL HYDROCHLORIDE 5 MG/ML
10 INJECTION, SOLUTION INTRAVENOUS
Status: DISCONTINUED | OUTPATIENT
Start: 2021-05-26 | End: 2021-05-26 | Stop reason: HOSPADM

## 2021-05-26 RX ADMIN — KETOROLAC TROMETHAMINE 30 MG: 30 INJECTION, SOLUTION INTRAMUSCULAR; INTRAVENOUS at 07:28

## 2021-05-26 RX ADMIN — FENTANYL CITRATE 100 MCG: 50 INJECTION, SOLUTION INTRAMUSCULAR; INTRAVENOUS at 08:23

## 2021-05-26 RX ADMIN — PROPOFOL 200 MG: 10 INJECTION, EMULSION INTRAVENOUS at 08:23

## 2021-05-26 RX ADMIN — MIDAZOLAM 2 MG: 1 INJECTION INTRAMUSCULAR; INTRAVENOUS at 08:18

## 2021-05-26 RX ADMIN — SODIUM CHLORIDE, POTASSIUM CHLORIDE, SODIUM LACTATE AND CALCIUM CHLORIDE: 600; 310; 30; 20 INJECTION, SOLUTION INTRAVENOUS at 08:43

## 2021-05-26 RX ADMIN — DEXAMETHASONE SODIUM PHOSPHATE 4 MG: 4 INJECTION, SOLUTION INTRA-ARTICULAR; INTRALESIONAL; INTRAMUSCULAR; INTRAVENOUS; SOFT TISSUE at 08:23

## 2021-05-26 RX ADMIN — LIDOCAINE HYDROCHLORIDE 50 MG: 10 INJECTION, SOLUTION INFILTRATION; PERINEURAL at 08:23

## 2021-05-26 RX ADMIN — ACETAMINOPHEN 975 MG: 325 TABLET, FILM COATED ORAL at 07:28

## 2021-05-26 RX ADMIN — SODIUM CHLORIDE, POTASSIUM CHLORIDE, SODIUM LACTATE AND CALCIUM CHLORIDE: 600; 310; 30; 20 INJECTION, SOLUTION INTRAVENOUS at 06:53

## 2021-05-26 RX ADMIN — ONDANSETRON HYDROCHLORIDE 4 MG: 2 INJECTION, SOLUTION INTRAVENOUS at 08:23

## 2021-05-26 RX ADMIN — GLYCOPYRROLATE 0.2 MG: 0.2 INJECTION, SOLUTION INTRAMUSCULAR; INTRAVENOUS at 08:23

## 2021-05-26 ASSESSMENT — ENCOUNTER SYMPTOMS
SEIZURES: 0
DYSRHYTHMIAS: 0

## 2021-05-26 ASSESSMENT — MIFFLIN-ST. JEOR: SCORE: 1229.61

## 2021-05-26 ASSESSMENT — LIFESTYLE VARIABLES: TOBACCO_USE: 0

## 2021-05-26 ASSESSMENT — COPD QUESTIONNAIRES: COPD: 0

## 2021-05-26 NOTE — ANESTHESIA CARE TRANSFER NOTE
Patient: Leslee Bazzi    Procedure(s):  HYSTEROSCOPY, DILATION AND CURETTAGE, NOVASURE ENDOMETRIAL ABLATION  HYSTEROSCOPY, WITH DILATION AND CURETTAGE OF UTERUS USING MORCELLATOR    Diagnosis: Abnormal uterine bleeding [N93.9]  Diagnosis Additional Information: No value filed.    Anesthesia Type:   General     Note:      Level of Consciousness: drowsy  Oxygen Supplementation: face mask    Independent Airway: airway patency satisfactory and stable  Dentition: dentition unchanged  Vital Signs Stable: post-procedure vital signs reviewed and stable  Report to RN Given: handoff report given  Patient transferred to: PACU    Handoff Report: Identifed the Patient, Identified the Reponsible Provider, Reviewed the pertinent medical history, Discussed the surgical course, Reviewed Intra-OP anesthesia mangement and issues during anesthesia, Set expectations for post-procedure period and Allowed opportunity for questions and acknowledgement of understanding      Vitals: (Last set prior to Anesthesia Care Transfer)  CRNA VITALS  5/26/2021 0834 - 5/26/2021 0910      5/26/2021             Pulse:  69    SpO2:  99 %    Resp Rate (observed):  9        Electronically Signed By: WALDEMAR Rodriguez CRNA  May 26, 2021  9:10 AM

## 2021-05-26 NOTE — ANESTHESIA PREPROCEDURE EVALUATION
Anesthesia Pre-Procedure Evaluation    Patient: Leslee Bazzi   MRN: [id number] : 1970        Preoperative Diagnosis: Abnormal uterine bleeding [N93.9]   Procedure : Procedure(s):  HYSTEROSCOPY, DILATION AND CURETTAGE, NOVASURE ENDOMETRIAL ABLATION  HYSTEROSCOPY, WITH DILATION AND CURETTAGE OF UTERUS USING MORCELLATOR     Past Medical History:   Diagnosis Date     Abnormal uterine bleeding      ACNE       Anemia      DEPRESSION POSTPARTUM 2005     GENERALIZED ABDOMINAL PAIN, ? IBS 2005     HX SUBLUXATION OF R PATELLA      Radial styloid tenosynovitis 2005      Past Surgical History:   Procedure Laterality Date     COLONOSCOPY       HC DESTRUCT PREMALIGNANT LESION, FIRST      shave bx, compound nevus L ant chest wall      Allergies   Allergen Reactions     No Known Drug Allergies       Social History     Tobacco Use     Smoking status: Never Smoker     Smokeless tobacco: Never Used   Substance Use Topics     Alcohol use: Yes     Comment: APPROX 0-6 DRINKS PER MONTH      Wt Readings from Last 1 Encounters:   21 60.9 kg (134 lb 3.2 oz)        Anesthesia Evaluation   Pt has had prior anesthetic. Type: General.    No history of anesthetic complications       ROS/MED HX  ENT/Pulmonary:     (+) sleep apnea (uses intermittently, I stressed that she definitely use it tonight and wheneverr using narcotics/sedatives.), uses CPAP,  (-) tobacco use, asthma, COPD and recent URI   Neurologic:  - neg neurologic ROS  (-) no seizures and no CVA   Cardiovascular:  - neg cardiovascular ROS  (-) hypertension, CAD, arrhythmias and valvular problems/murmurs   METS/Exercise Tolerance: >4 METS    Hematologic: Comments: Lab Test        21                       1214          WBC          7.5           HGB          10.2*         MCV          96            PLT          312            No lab results found. - neg hematologic  ROS     Musculoskeletal:  - neg musculoskeletal ROS     GI/Hepatic:  - neg  GI/hepatic ROS  (-) GERD   Renal/Genitourinary:  - neg Renal ROS     Endo:  - neg endo ROS  (-) Type I DM, Type II DM, thyroid disease, chronic steroid usage and obesity   Psychiatric/Substance Use:  - neg psychiatric ROS     Infectious Disease:  - neg infectious disease ROS     Malignancy:  - neg malignancy ROS     Other:  - neg other ROS          Physical Exam    Airway        Mallampati: II   TM distance: > 3 FB   Neck ROM: full   Mouth opening: > 3 cm    Respiratory Devices and Support         Dental  no notable dental history         Cardiovascular   cardiovascular exam normal          Pulmonary   pulmonary exam normal                OUTSIDE LABS:  CBC:   Lab Results   Component Value Date    WBC 7.5 02/23/2021    WBC 8.2 08/16/2005    HGB 10.2 (L) 02/23/2021    HGB 13.5 08/16/2005    HCT 32.1 (L) 02/23/2021    HCT 41.4 08/16/2005     02/23/2021     08/16/2005     BMP:   Lab Results   Component Value Date     08/16/2005    POTASSIUM 3.8 08/16/2005    CHLORIDE 102 08/16/2005    CO2 27 08/16/2005    BUN 15 08/16/2005    CR 0.90 08/16/2005    GLC 87 08/16/2005     COAGS: No results found for: PTT, INR, FIBR  POC:   Lab Results   Component Value Date    HCG Positive (A) 08/09/2004     HEPATIC: No results found for: ALBUMIN, PROTTOTAL, ALT, AST, GGT, ALKPHOS, BILITOTAL, BILIDIRECT, RACHNA  OTHER:   Lab Results   Component Value Date    YUNIOR 8.9 08/16/2005    TSH 0.92 02/23/2021       Anesthesia Plan    ASA Status:  2   NPO Status:  NPO Appropriate    Anesthesia Type: General.     - Airway: LMA   Induction: Intravenous.   Maintenance: Balanced.        Consents    Anesthesia Plan(s) and associated risks, benefits, and realistic alternatives discussed. Questions answered and patient/representative(s) expressed understanding.     - Discussed with:  Patient         Postoperative Care    Pain management: IV analgesics, Oral pain medications.   PONV prophylaxis: Ondansetron (or other 5HT-3), Dexamethasone  or Solumedrol     Comments:                Heriberto Cox MD

## 2021-05-26 NOTE — OP NOTE
Operative Note    Patient: Leslee Bazzi  : 1970  MRN: [id number]    Date of Service: 2021    Pre-operative diagnosis:  Abnormal uterine bleeding   Iron deficiency anemia    Post-operative diagnosis:  Same     Procedure:   Hysteroscopy   Dilation and curettage with myosure morcellator  Novasure endometrial ablation     Surgeon: Fanny Wilcox MD    Anesthesia: General    EBL: 5cc  Urine: 100cc  Fluids: 1100cc    Specimens: endometrial curettings  Complications: none apparent    Findings: normal thin endometrial lining, uterine cavity length 5.5cm, uterine width 4.0cm, burned at 121 W for 31 seconds    Indications: Leslee Bazzi is a 50 year old female with abnormal uterine bleeding, irregular, and heavy who desired treatment with endometrial ablation.  She had a normal pelvic ultrasound, and declined pre operative EMB.  Was using vasectomy for contraception.  Discussed risks, benefits, and alternatives to the procedure including risk of infection, bleeding, damage to local organs, blood clots, risk of abnormal endometrial cells found requiring a different procedure typically hysterectomy. The patient's questions were answered, understanding confirmed, and the patient signed written informed consent.    Procedure: The patient was taken to the operating room where she underwent anesthesia and was prepped and draped in the usual sterile fashion in dorsal lithotomy position.  A time-out was performed.  A speculum was inserted into the vagina and the cervix visualized. A single-toothed tenaculum was placed at 12 o'clock on the cervix.  The cervix was dilated using sequential dilators up to 6 mm.  The hysteroscope was assembled and inserted through the cervix.  The cavity was visualized with findings as above.  The morcellator was then inserted through the operating port on the hysteroscope and the lining was removed under direct visualization.  The tissue was sent for pathology.  The sound  was used to measure the cervical length and uterine cavity length.  The Novasure ablation device was inserted through the internal os and the plunger was applied.  The device was deployed, rotated 45 degrees to the left and right, and pulled back to allow full opening of the device.  The width of the uterine cavity was measured.  The system was tested and found to be suitable for ablation.  The ablation ran for 31 seconds at a power of 121 W.  The device was retracted and withdrawn from the uterine cavity, then re-deployed to reveal good remnant of tissue on the device.  The hysteroscope was reinserted.  Before and after photos were taken.  The hysteroscope was removed. The tenaculum was removed from the cervix and good hemostasis was noted.  The speculum was removed from the vagina.  The patient tolerated the procedure well and was taken to the recovery room in stable condition.  Instrument, needle, and sponge counts were correct x2.      Fanny Wilcox MD, MPH  [facility]      Before        After

## 2021-05-26 NOTE — DISCHARGE INSTRUCTIONS
Maximum acetaminophen (Tylenol) dose from all sources should not exceed 4 grams (4000 mg) per day. You had tylenol 975 mg at 7:30 am  You received Toradol, an IV form of ibuprofen (Motrin) at 7:30 am.  Do not take any ibuprofen products until 1:30 pm.    DR. DESIRAE WRIGHT M.D.        CLINIC PHONE NUMBER:  [phone]  [facility]        GENERAL ANESTHESIA OR SEDATION ADULT DISCHARGE INSTRUCTIONS   SPECIAL PRECAUTIONS FOR 24 HOURS AFTER SURGERY    IT IS NOT UNUSUAL TO FEEL LIGHT-HEADED OR FAINT, UP TO 24 HOURS AFTER SURGERY OR WHILE TAKING PAIN MEDICATION.  IF YOU HAVE THESE SYMPTOMS; SIT FOR A FEW MINUTES BEFORE STANDING AND HAVE SOMEONE ASSIST YOU WHEN YOU GET UP TO WALK OR USE THE BATHROOM.    YOU SHOULD REST AND RELAX FOR THE NEXT 24 HOURS AND YOU MUST MAKE ARRANGEMENTS TO HAVE SOMEONE STAY WITH YOU FOR AT LEAST 24 HOURS AFTER YOUR DISCHARGE.  AVOID HAZARDOUS AND STRENUOUS ACTIVITIES.  DO NOT MAKE IMPORTANT DECISIONS FOR 24 HOURS.    DO NOT DRIVE ANY VEHICLE OR OPERATE MECHANICAL EQUIPMENT FOR 24 HOURS FOLLOWING THE END OF YOUR SURGERY.  EVEN THOUGH YOU MAY FEEL NORMAL, YOUR REACTIONS MAY BE AFFECTED BY THE MEDICATION YOU HAVE RECEIVED.    DO NOT DRINK ALCOHOLIC BEVERAGES FOR 24 HOURS FOLLOWING YOUR SURGERY.    DRINK CLEAR LIQUIDS (APPLE JUICE, GINGER ALE, 7-UP, BROTH, ETC.).  PROGRESS TO YOUR REGULAR DIET AS YOU FEEL ABLE.    YOU MAY HAVE A DRY MOUTH, A SORE THROAT, MUSCLES ACHES OR TROUBLE SLEEPING.  THESE SHOULD GO AWAY AFTER 24 HOURS.    CALL YOUR DOCTOR FOR ANY OF THE FOLLOWING:  SIGNS OF INFECTION (FEVER, GROWING TENDERNESS AT THE SURGERY SITE, A LARGE AMOUNT OF DRAINAGE OR BLEEDING, SEVERE PAIN, FOUL-SMELLING DRAINAGE, REDNESS OR SWELLING.    IT HAS BEEN OVER 8 TO 10 HOURS SINCE SURGERY AND YOU ARE STILL NOT ABLE TO URINATE (PASS WATER).

## 2021-05-26 NOTE — ANESTHESIA POSTPROCEDURE EVALUATION
Patient: Leslee Bazzi    Procedure(s):  HYSTEROSCOPY, DILATION AND CURETTAGE, NOVASURE ENDOMETRIAL ABLATION  HYSTEROSCOPY, WITH DILATION AND CURETTAGE OF UTERUS USING MORCELLATOR    Diagnosis:Abnormal uterine bleeding [N93.9]  Diagnosis Additional Information: No value filed.    Anesthesia Type:  General    Note:  Disposition: Outpatient   Postop Pain Control: Uneventful            Sign Out: Well controlled pain   PONV: No   Neuro/Psych: Uneventful            Sign Out: Acceptable/Baseline neuro status   Airway/Respiratory: Uneventful            Sign Out: Acceptable/Baseline resp. status   CV/Hemodynamics: Uneventful            Sign Out: Acceptable CV status; No obvious hypovolemia; No obvious fluid overload   Other NRE:    DID A NON-ROUTINE EVENT OCCUR?            Last vitals:  Vitals:    05/26/21 1004 05/26/21 1010 05/26/21 1038   BP: 115/85  115/72   Pulse: 70     Resp: 16  16   Temp: 97.6  F (36.4  C)  97.2  F (36.2  C)   SpO2: 100% 99% 99%       Last vitals prior to Anesthesia Care Transfer:  CRNA VITALS  5/26/2021 0834 - 5/26/2021 0934      5/26/2021             Pulse:  69    SpO2:  99 %    Resp Rate (observed):  9          Electronically Signed By: Heriberto Cox MD  May 26, 2021  10:48 AM

## 2021-05-27 LAB — COPATH REPORT: NORMAL

## 2021-09-19 ENCOUNTER — HEALTH MAINTENANCE LETTER (OUTPATIENT)
Age: 51
End: 2021-09-19

## 2022-04-30 ENCOUNTER — HEALTH MAINTENANCE LETTER (OUTPATIENT)
Age: 52
End: 2022-04-30

## 2022-11-20 ENCOUNTER — HEALTH MAINTENANCE LETTER (OUTPATIENT)
Age: 52
End: 2022-11-20

## 2023-06-02 ENCOUNTER — HEALTH MAINTENANCE LETTER (OUTPATIENT)
Age: 53
End: 2023-06-02

## (undated) DEVICE — BAG CLEAR TRASH 1.3M 39X33" P4040C

## (undated) DEVICE — KIT ESU ABLATION ENDOMETRIAL 6MM NOVASURE NS2013US

## (undated) DEVICE — DRAPE UNDER BUTTOCK 89415

## (undated) DEVICE — LINEN FULL SHEET 5511

## (undated) DEVICE — PAD CHUX UNDERPAD 30X36" P3036C

## (undated) DEVICE — SOL NACL 0.9% IRRIG 3000ML BAG 2B7477

## (undated) DEVICE — SOL NACL 0.9% IRRIG 1000ML BOTTLE 2F7124

## (undated) DEVICE — CATH INTERMITTENT CLEAN-CATH FEMALE 14FR 6" VINYL LF 420614

## (undated) DEVICE — BASIN SET MINOR DISP

## (undated) DEVICE — GLOVE PROTEXIS W/NEU-THERA 6.5  2D73TE65

## (undated) DEVICE — SEAL SET MYOSURE ROD LENS SCOPE SINGLE USE 40-902

## (undated) DEVICE — SOL NACL 0.9% INJ 1000ML BAG 2B1324X

## (undated) DEVICE — Device

## (undated) DEVICE — LINEN HALF SHEET 5512

## (undated) DEVICE — GLOVE PROTEXIS BLUE W/NEU-THERA 7.0  2D73EB70

## (undated) DEVICE — KIT PROCEDURE FLUENT IN/OUT FLOWPAK TISS TRAP FLT-112S

## (undated) DEVICE — PREP POVIDONE IODINE SOLUTION 10% 120ML

## (undated) DEVICE — PACK MINOR LITHOTOMY RIDGES

## (undated) DEVICE — PREP POVIDONE IODINE SCRUB 7.5% 120ML

## (undated) DEVICE — GLOVE PROTEXIS POWDER FREE SMT 6.5  2D72PT65X

## (undated) RX ORDER — ACETAMINOPHEN 325 MG/1
TABLET ORAL
Status: DISPENSED
Start: 2021-05-26

## (undated) RX ORDER — DEXAMETHASONE SODIUM PHOSPHATE 4 MG/ML
INJECTION, SOLUTION INTRA-ARTICULAR; INTRALESIONAL; INTRAMUSCULAR; INTRAVENOUS; SOFT TISSUE
Status: DISPENSED
Start: 2021-05-26

## (undated) RX ORDER — FENTANYL CITRATE 50 UG/ML
INJECTION, SOLUTION INTRAMUSCULAR; INTRAVENOUS
Status: DISPENSED
Start: 2021-05-26

## (undated) RX ORDER — ONDANSETRON 2 MG/ML
INJECTION INTRAMUSCULAR; INTRAVENOUS
Status: DISPENSED
Start: 2021-05-26

## (undated) RX ORDER — GLYCOPYRROLATE 0.2 MG/ML
INJECTION INTRAMUSCULAR; INTRAVENOUS
Status: DISPENSED
Start: 2021-05-26

## (undated) RX ORDER — KETOROLAC TROMETHAMINE 30 MG/ML
INJECTION, SOLUTION INTRAMUSCULAR; INTRAVENOUS
Status: DISPENSED
Start: 2021-05-26

## (undated) RX ORDER — PROPOFOL 10 MG/ML
INJECTION, EMULSION INTRAVENOUS
Status: DISPENSED
Start: 2021-05-26

## (undated) RX ORDER — LIDOCAINE HYDROCHLORIDE 10 MG/ML
INJECTION, SOLUTION EPIDURAL; INFILTRATION; INTRACAUDAL; PERINEURAL
Status: DISPENSED
Start: 2021-05-26